# Patient Record
Sex: FEMALE | Race: ASIAN | Employment: FULL TIME | URBAN - METROPOLITAN AREA
[De-identification: names, ages, dates, MRNs, and addresses within clinical notes are randomized per-mention and may not be internally consistent; named-entity substitution may affect disease eponyms.]

---

## 2019-04-04 ENCOUNTER — LAB ENCOUNTER (OUTPATIENT)
Dept: LAB | Age: 24
End: 2019-04-04
Attending: INTERNAL MEDICINE
Payer: COMMERCIAL

## 2019-04-04 ENCOUNTER — OFFICE VISIT (OUTPATIENT)
Dept: INTERNAL MEDICINE CLINIC | Facility: CLINIC | Age: 24
End: 2019-04-04
Payer: COMMERCIAL

## 2019-04-04 VITALS
BODY MASS INDEX: 29.84 KG/M2 | WEIGHT: 152 LBS | DIASTOLIC BLOOD PRESSURE: 80 MMHG | SYSTOLIC BLOOD PRESSURE: 114 MMHG | HEIGHT: 59.7 IN | HEART RATE: 76 BPM | TEMPERATURE: 99 F | OXYGEN SATURATION: 99 %

## 2019-04-04 DIAGNOSIS — Z00.00 PHYSICAL EXAM: ICD-10-CM

## 2019-04-04 DIAGNOSIS — E28.2 PCOS (POLYCYSTIC OVARIAN SYNDROME): ICD-10-CM

## 2019-04-04 DIAGNOSIS — Z00.00 PHYSICAL EXAM: Primary | ICD-10-CM

## 2019-04-04 DIAGNOSIS — N91.2 AMENORRHEA: ICD-10-CM

## 2019-04-04 PROCEDURE — 80061 LIPID PANEL: CPT

## 2019-04-04 PROCEDURE — 36415 COLL VENOUS BLD VENIPUNCTURE: CPT

## 2019-04-04 PROCEDURE — 84443 ASSAY THYROID STIM HORMONE: CPT

## 2019-04-04 PROCEDURE — 85025 COMPLETE CBC W/AUTO DIFF WBC: CPT

## 2019-04-04 PROCEDURE — 99385 PREV VISIT NEW AGE 18-39: CPT | Performed by: INTERNAL MEDICINE

## 2019-04-04 PROCEDURE — 83036 HEMOGLOBIN GLYCOSYLATED A1C: CPT

## 2019-04-04 PROCEDURE — 80053 COMPREHEN METABOLIC PANEL: CPT

## 2019-04-04 PROCEDURE — 84439 ASSAY OF FREE THYROXINE: CPT

## 2019-04-04 NOTE — PROGRESS NOTES
HPI:    Patient ID: Diane Shields is a 26year old female. Patient presents to the office for the first time to establish care. Patient is a 52-year-old female with history of polycystic ovary disease and amenorrhea.   She had been under the care of a gy frequency, urgency and vaginal discharge. Musculoskeletal: Negative for back pain and neck pain. Allergic/Immunologic: Negative for environmental allergies and food allergies.    Neurological: Negative for dizziness, syncope, light-headedness and headac (primary encounter diagnosis)  Pcos (polycystic ovarian syndrome)  Amenorrhea    She has polycystic ovary disease along with amenorrhea. Will have patient see gynecology for further management.   Patient will probably need to start hormonal management to i

## 2019-04-05 ENCOUNTER — APPOINTMENT (OUTPATIENT)
Dept: LAB | Age: 24
End: 2019-04-05
Attending: INTERNAL MEDICINE
Payer: COMMERCIAL

## 2019-04-05 ENCOUNTER — TELEPHONE (OUTPATIENT)
Dept: INTERNAL MEDICINE CLINIC | Facility: CLINIC | Age: 24
End: 2019-04-05

## 2019-04-05 DIAGNOSIS — R79.89 LFT ELEVATION: ICD-10-CM

## 2019-04-05 DIAGNOSIS — R79.89 LFT ELEVATION: Primary | ICD-10-CM

## 2019-04-05 PROCEDURE — 87340 HEPATITIS B SURFACE AG IA: CPT

## 2019-04-05 PROCEDURE — 86704 HEP B CORE ANTIBODY TOTAL: CPT

## 2019-04-05 PROCEDURE — 80500 HEPATITIS A B + C PROFILE: CPT

## 2019-04-05 PROCEDURE — 86706 HEP B SURFACE ANTIBODY: CPT

## 2019-04-05 PROCEDURE — 36415 COLL VENOUS BLD VENIPUNCTURE: CPT

## 2019-04-05 PROCEDURE — 86708 HEPATITIS A ANTIBODY: CPT

## 2019-04-05 PROCEDURE — 86803 HEPATITIS C AB TEST: CPT

## 2019-04-05 NOTE — TELEPHONE ENCOUNTER
I spoke with patient and relayed Dr. Justina Webber message. Patient verbalized understanding. I spoke with the lab and they are unable to add on the hepatitis panel because it requires a gold top. I spoke with patient and asked her if she would be able to get her blood drawn today. Patient says she thinks she can go today.

## 2019-04-05 NOTE — TELEPHONE ENCOUNTER
----- Message from Boston Melvin MD sent at 4/5/2019  7:48 AM CDT -----  General general chemistries show LFT elevation. I have added hepatitis profile to patient's previously drawn blood. If this is normal, may need to pursue liver ultrasound. Otherwise general chemistries, CBC, thyroid function test, hemoglobin A1c are all unremarkable. Cholesterol is 179 with LDL of 106--this is at goal LDL of less than 130. No other interventions needed at this time. Await gynecological evaluation for further management of polycystic ovary disease and amenorrhea.

## 2019-04-08 DIAGNOSIS — R79.89 LFT ELEVATION: Primary | ICD-10-CM

## 2019-04-23 ENCOUNTER — OFFICE VISIT (OUTPATIENT)
Dept: OBGYN CLINIC | Facility: CLINIC | Age: 24
End: 2019-04-23
Payer: COMMERCIAL

## 2019-04-23 VITALS
WEIGHT: 157.19 LBS | SYSTOLIC BLOOD PRESSURE: 126 MMHG | DIASTOLIC BLOOD PRESSURE: 83 MMHG | HEIGHT: 59.5 IN | BODY MASS INDEX: 31.27 KG/M2 | HEART RATE: 77 BPM

## 2019-04-23 DIAGNOSIS — E28.2 PCOS (POLYCYSTIC OVARIAN SYNDROME): Primary | ICD-10-CM

## 2019-04-23 PROCEDURE — 99203 OFFICE O/P NEW LOW 30 MIN: CPT | Performed by: OBSTETRICS & GYNECOLOGY

## 2019-04-23 NOTE — PROGRESS NOTES
Anurag Epstein is a 26year old female  Patient's last menstrual period was 2018 (approximate). Patient presents with:  Consult: PCOS/New pt to [facility]     Patient is a new patient at 19yo who presents for amenorrhea for one year.  Pt states she has Lifestyle      Physical activity:        Days per week: Not on file        Minutes per session: Not on file      Stress: Not on file    Relationships      Social connections:        Talks on phone: Not on file        Gets together: Not on file        Atten ovarian syndrome)  -     ESTRADIOL; Future  -     FSH; Future  -     LH (LUTEINIZING HORMONE); Future  -     PROLACTIN; Future  -     ASSAY, THYROID STIM HORMONE; Future  -     TESTOSTERONE,FREE AND TOTAL;  Future  -     DEHYDROEPIANDROSTERONE SULFATE; Futu

## 2019-04-25 ENCOUNTER — HOSPITAL ENCOUNTER (OUTPATIENT)
Dept: ULTRASOUND IMAGING | Age: 24
Discharge: HOME | End: 2019-04-25
Attending: INTERNAL MEDICINE
Payer: COMMERCIAL

## 2019-04-25 DIAGNOSIS — R79.89 ELEVATED LFTS: Primary | ICD-10-CM

## 2019-04-25 DIAGNOSIS — R79.89 LFT ELEVATION: ICD-10-CM

## 2019-04-25 PROCEDURE — 76705 ECHO EXAM OF ABDOMEN: CPT | Performed by: INTERNAL MEDICINE

## 2019-05-02 ENCOUNTER — APPOINTMENT (OUTPATIENT)
Dept: LAB | Age: 24
End: 2019-05-02
Attending: OBSTETRICS & GYNECOLOGY
Payer: COMMERCIAL

## 2019-05-02 ENCOUNTER — HOSPITAL ENCOUNTER (OUTPATIENT)
Dept: ULTRASOUND IMAGING | Age: 24
Discharge: HOME | End: 2019-05-02
Attending: OBSTETRICS & GYNECOLOGY
Payer: COMMERCIAL

## 2019-05-02 DIAGNOSIS — E28.2 PCOS (POLYCYSTIC OVARIAN SYNDROME): ICD-10-CM

## 2019-05-02 PROCEDURE — 84146 ASSAY OF PROLACTIN: CPT

## 2019-05-02 PROCEDURE — 82627 DEHYDROEPIANDROSTERONE: CPT

## 2019-05-02 PROCEDURE — 83002 ASSAY OF GONADOTROPIN (LH): CPT

## 2019-05-02 PROCEDURE — 84443 ASSAY THYROID STIM HORMONE: CPT

## 2019-05-02 PROCEDURE — 84403 ASSAY OF TOTAL TESTOSTERONE: CPT

## 2019-05-02 PROCEDURE — 83001 ASSAY OF GONADOTROPIN (FSH): CPT

## 2019-05-02 PROCEDURE — 82670 ASSAY OF TOTAL ESTRADIOL: CPT

## 2019-05-02 PROCEDURE — 36415 COLL VENOUS BLD VENIPUNCTURE: CPT

## 2019-05-02 PROCEDURE — 76856 US EXAM PELVIC COMPLETE: CPT | Performed by: OBSTETRICS & GYNECOLOGY

## 2019-05-02 PROCEDURE — 84402 ASSAY OF FREE TESTOSTERONE: CPT

## 2019-05-03 ENCOUNTER — TELEPHONE (OUTPATIENT)
Dept: OBGYN CLINIC | Facility: CLINIC | Age: 24
End: 2019-05-03

## 2019-05-03 NOTE — TELEPHONE ENCOUNTER
Called lab and talked to Marlon and she she stated they can run, Estradiol, LH, FSH, Prolactin and TSH off of the blood they robbin for other labs yesterday. Sent to [facility] as a FYI.      Informed pt that the lab is adding on the above labs with blood they have dr

## 2019-05-03 NOTE — TELEPHONE ENCOUNTER
----- Message from Elisha Hennessy MD sent at 5/3/2019  2:10 PM CDT -----  Please let patient know that her US was normal. I recommend she make an apt to discuss results of labs, ultrasound and discuss treatment options.

## 2019-05-03 NOTE — TELEPHONE ENCOUNTER
Informed pt that MD stated that her her US was normal and she recommend she make an apt to discuss results of labs, ultrasound and discuss treatment options.

## 2019-05-22 ENCOUNTER — OFFICE VISIT (OUTPATIENT)
Dept: OBGYN CLINIC | Facility: CLINIC | Age: 24
End: 2019-05-22
Payer: COMMERCIAL

## 2019-05-22 VITALS
DIASTOLIC BLOOD PRESSURE: 83 MMHG | SYSTOLIC BLOOD PRESSURE: 130 MMHG | HEART RATE: 64 BPM | BODY MASS INDEX: 32 KG/M2 | WEIGHT: 160 LBS

## 2019-05-22 DIAGNOSIS — E28.2 PCOS (POLYCYSTIC OVARIAN SYNDROME): Primary | ICD-10-CM

## 2019-05-22 PROCEDURE — 99213 OFFICE O/P EST LOW 20 MIN: CPT | Performed by: OBSTETRICS & GYNECOLOGY

## 2019-05-22 NOTE — PROGRESS NOTES
Archana Pettit is a 26year old female  No LMP recorded. (Menstrual status: Polycystic Ovarian Syndrome). Patient presents with: Follow - Up: Pt in to discuss her lab results and u/s.       Patient is a 19yo female who presents with her mother today t Gets together: Not on file        Attends Mandaen service: Not on file        Active member of club or organization: Not on file        Attends meetings of clubs or organizations: Not on file        Relationship status: Not on file      Intimate partn prior to doing so. 4. At this time will start Provera for 5 days. Pt to call if no menses. 5. Pt to call q 3 months for Provera if unable to take OCPs    I spent 15 minutes on the patient and more than 50% was face to face time.

## 2019-06-22 ENCOUNTER — PATIENT MESSAGE (OUTPATIENT)
Dept: INTERNAL MEDICINE CLINIC | Facility: CLINIC | Age: 24
End: 2019-06-22

## 2019-06-22 DIAGNOSIS — H53.9 VISION CHANGES: Primary | ICD-10-CM

## 2019-06-24 NOTE — TELEPHONE ENCOUNTER
From: Bere Barroso  To:  Jaja Lama MD  Sent: 6/22/2019 10:44 PM CDT  Subject: Referral Request    Good evening,    I broke my glasses and when I went to order another pair, I realized that its been over a year since my last eye exam. With Giovnaa,

## 2019-08-09 ENCOUNTER — OFFICE VISIT (OUTPATIENT)
Dept: OPTOMETRY | Facility: CLINIC | Age: 24
End: 2019-08-09
Payer: MEDICAID

## 2019-08-09 DIAGNOSIS — H52.13 MYOPIA OF BOTH EYES WITH ASTIGMATISM: Primary | ICD-10-CM

## 2019-08-09 DIAGNOSIS — H52.203 MYOPIA OF BOTH EYES WITH ASTIGMATISM: Primary | ICD-10-CM

## 2019-08-09 PROCEDURE — 92015 DETERMINE REFRACTIVE STATE: CPT | Performed by: OPTOMETRIST

## 2019-08-09 PROCEDURE — 92004 COMPRE OPH EXAM NEW PT 1/>: CPT | Performed by: OPTOMETRIST

## 2019-08-09 NOTE — PROGRESS NOTES
Clarence Powell is a 26year old female. HPI:     HPI     Patient broke her glasses and she needs a new RX before buying another pair. She has no complaints with her vision. States has [place] lot of astigmatism\".     Last edited by Sheryle Maltos, KRISTIAN on 8/9/2019 External Exam       Right Left    External Normal Normal          Slit Lamp Exam       Right Left    Lids/Lashes Normal Normal    Conjunctiva/Sclera Normal Normal    Cornea Clear Clear    Anterior Chamber Deep and quiet Deep and quiet    Iris Normal Normal

## 2019-10-02 RX ORDER — MEDROXYPROGESTERONE ACETATE 10 MG/1
10 TABLET ORAL DAILY
Qty: 5 TABLET | Refills: 0 | Status: SHIPPED | OUTPATIENT
Start: 2019-10-02 | End: 2020-07-13

## 2020-04-03 ENCOUNTER — TELEPHONE (OUTPATIENT)
Dept: INTERNAL MEDICINE CLINIC | Facility: CLINIC | Age: 25
End: 2020-04-03

## 2020-04-03 RX ORDER — MEDROXYPROGESTERONE ACETATE 10 MG/1
10 TABLET ORAL DAILY
Qty: 5 TABLET | Refills: 1 | Status: SHIPPED | OUTPATIENT
Start: 2020-04-03 | End: 2020-07-13

## 2020-04-03 NOTE — TELEPHONE ENCOUNTER
Sorry there was a misunderstand as I was told she wanted OCPs. [id number] for Provera. I already sent it to her pharmacy. She needs to take home UPT first and then can start Provera.

## 2020-04-03 NOTE — TELEPHONE ENCOUNTER
Pts cousin called - pt  is establishing care -- she states pt  has been out of her meds and has not had a period in 1 mns -- she had some insurance problems in the recent past but thinks its is now all taken care of --needs a refill on her ocps   Reviewed

## 2020-04-03 NOTE — TELEPHONE ENCOUNTER
Informed pt that there was a misunderstanding as she was informed that she wanted ocps. Berta Cable for Provera. Informed pt that it was sent to her pharmacy. Informed pt tht she needs to take a home upt and if neg then she can start Provera.

## 2020-04-03 NOTE — TELEPHONE ENCOUNTER
Please call patient and see if she needs a refill on OCPs. I am ok with 3 months worth of micronor for patient. She needs to take a home UPT first before starting.

## 2020-04-03 NOTE — TELEPHONE ENCOUNTER
Informed pt of [facility] recs below. Pt states she is asking for refill of Provera and not OCPs. Pt states she has never been on OCPs and would like to continue Provera. Informed pt Provera is not BC. Pt states she knows but wants [facility] to refill Provera.  Asked pt

## 2020-07-13 ENCOUNTER — OFFICE VISIT (OUTPATIENT)
Dept: INTERNAL MEDICINE CLINIC | Facility: CLINIC | Age: 25
End: 2020-07-13
Payer: COMMERCIAL

## 2020-07-13 VITALS
WEIGHT: 166.81 LBS | BODY MASS INDEX: 33.63 KG/M2 | HEIGHT: 59 IN | DIASTOLIC BLOOD PRESSURE: 102 MMHG | SYSTOLIC BLOOD PRESSURE: 132 MMHG | TEMPERATURE: 99 F | HEART RATE: 97 BPM

## 2020-07-13 DIAGNOSIS — R59.0 ENLARGED LYMPH NODE IN NECK: Primary | ICD-10-CM

## 2020-07-13 PROCEDURE — 99203 OFFICE O/P NEW LOW 30 MIN: CPT | Performed by: PHYSICIAN ASSISTANT

## 2020-07-13 PROCEDURE — 99212 OFFICE O/P EST SF 10 MIN: CPT | Performed by: PHYSICIAN ASSISTANT

## 2020-07-13 RX ORDER — METHYLPREDNISOLONE 4 MG/1
TABLET ORAL
Qty: 1 KIT | Refills: 0 | Status: SHIPPED | OUTPATIENT
Start: 2020-07-13 | End: 2020-09-11

## 2020-07-13 NOTE — PROGRESS NOTES
HPI:    Patient ID: Yuri Lang is a 22year old female. HPI   Patient presents with left sided neck pain, pain with swallowing and point tenderness in the submental area. Denies fever, URI sx, ear pain.  States happened over 2 days time, worse today Cardiovascular: Normal rate, regular rhythm and normal heart sounds. Pulmonary/Chest: Effort normal and breath sounds normal. No respiratory distress. She has no wheezes. Abdominal: Soft. There is no tenderness.    Lymphadenopathy:     She has no cervi

## 2020-09-11 ENCOUNTER — OFFICE VISIT (OUTPATIENT)
Dept: INTERNAL MEDICINE CLINIC | Facility: CLINIC | Age: 25
End: 2020-09-11
Payer: COMMERCIAL

## 2020-09-11 VITALS
HEIGHT: 59 IN | DIASTOLIC BLOOD PRESSURE: 88 MMHG | TEMPERATURE: 97 F | WEIGHT: 169 LBS | RESPIRATION RATE: 17 BRPM | HEART RATE: 73 BPM | BODY MASS INDEX: 34.07 KG/M2 | SYSTOLIC BLOOD PRESSURE: 138 MMHG

## 2020-09-11 DIAGNOSIS — N91.2 AMENORRHEA: ICD-10-CM

## 2020-09-11 DIAGNOSIS — E28.2 PCOS (POLYCYSTIC OVARIAN SYNDROME): ICD-10-CM

## 2020-09-11 DIAGNOSIS — I10 HYPERTENSION, UNSPECIFIED TYPE: ICD-10-CM

## 2020-09-11 DIAGNOSIS — R74.8 ELEVATED LIVER ENZYMES: ICD-10-CM

## 2020-09-11 DIAGNOSIS — H61.23 BILATERAL IMPACTED CERUMEN: ICD-10-CM

## 2020-09-11 DIAGNOSIS — E66.09 CLASS 1 OBESITY DUE TO EXCESS CALORIES WITH SERIOUS COMORBIDITY AND BODY MASS INDEX (BMI) OF 34.0 TO 34.9 IN ADULT: ICD-10-CM

## 2020-09-11 DIAGNOSIS — Z00.00 PHYSICAL EXAM: Primary | ICD-10-CM

## 2020-09-11 PROCEDURE — 3008F BODY MASS INDEX DOCD: CPT | Performed by: INTERNAL MEDICINE

## 2020-09-11 PROCEDURE — 3075F SYST BP GE 130 - 139MM HG: CPT | Performed by: INTERNAL MEDICINE

## 2020-09-11 PROCEDURE — 99395 PREV VISIT EST AGE 18-39: CPT | Performed by: INTERNAL MEDICINE

## 2020-09-11 PROCEDURE — 3079F DIAST BP 80-89 MM HG: CPT | Performed by: INTERNAL MEDICINE

## 2020-09-11 RX ORDER — MEDROXYPROGESTERONE ACETATE 5 MG/1
10 TABLET ORAL DAILY
COMMUNITY
Start: 2020-04-04 | End: 2020-11-04

## 2020-09-11 RX ORDER — MEDROXYPROGESTERONE ACETATE 5 MG/1
10 TABLET ORAL DAILY
Qty: 10 TABLET | Refills: 0 | Status: CANCELLED | OUTPATIENT
Start: 2020-09-11

## 2020-09-11 NOTE — PROGRESS NOTES
Minh Min is a 22year old female.   Patient presents with:  Referral: GYN      HPI:   New patient--used to see dr Margo Sterling   C/C physical  C/o LMP 4/2020 --wanted Medroxyprogesterone to initiate bleeding  Was on metformin    Noted bp is elevated       PMH clear, no frontal or maxillary sinus tenderness, pupils equal reactive light bilaterally, extra muscles intact  NECK: supple,no adenopathy, nontender  LUNGS: clear to auscultation, no wheeze  CARDIO: RRR without murmur  GI: good BS's,no masses or tendernes

## 2020-09-11 NOTE — PATIENT INSTRUCTIONS
Prevention Guidelines, Women Ages 25 to 44  Screening tests and vaccines are an important part of managing your health. A screening test is done to find possible disorders or diseases in people who don't have any symptoms.  The goal is to find a disease e Anyone at increased risk  At routine exams   HIV All women At routine exams3     Obesity All women in this age group  At routine exams   Syphilis Women at increased risk for infection should talk with their healthcare provider  At routine exams   Tuberculo (protects against 13 types of pneumococcal bacteria)   PPSV23: 1 to 2 doses through age 59, or 1 dose at 72 or older (protects against 23 types of pneumococcal bacteria)    Tetanus/diphtheria/pertussis (Td/Tdap) booster All women in this age group  Td ever All rights reserved. This information is not intended as a substitute for professional medical care. Always follow your healthcare professional's instructions. Eating for your heart doesn’t have to be hard or boring.  You just need to know how to make meats, poultry, fish  Servings: 6 or fewer a day  A serving is:  · 1 ounce cooked meats, poultry, or fish  · 1 egg  Best choices: Lean poultry and fish. Trim away visible fat. Broil, grill, roast, or boil instead of frying.  Remove skin from poultry before labels.   · Low-fat milk or yogurt  · Unsalted eggs  · Shredded wheat  · Corn tortillas  · Unsalted steamed rice  · Regular (not instant) hot cereal, made without salt  Stay away from:  · Sausage, oconnell, and ham  · Flour tortillas  · Packaged muffins, panca

## 2020-09-17 LAB
ABSOLUTE BASOPHILS: 91 CELLS/UL (ref 0–200)
ABSOLUTE EOSINOPHILS: 198 CELLS/UL (ref 15–500)
ABSOLUTE LYMPHOCYTES: 3200 CELLS/UL (ref 850–3900)
ABSOLUTE MONOCYTES: 608 CELLS/UL (ref 200–950)
ABSOLUTE NEUTROPHILS: 3504 CELLS/UL (ref 1500–7800)
ALBUMIN/GLOBULIN RATIO: 1.3 (CALC) (ref 1–2.5)
ALBUMIN: 4.2 G/DL (ref 3.6–5.1)
ALKALINE PHOSPHATASE: 52 U/L (ref 31–125)
ALT: 184 U/L (ref 6–29)
AST: 84 U/L (ref 10–30)
BASOPHILS: 1.2 %
BILIRUBIN, TOTAL: 1.4 MG/DL (ref 0.2–1.2)
BUN: 10 MG/DL (ref 7–25)
CALCIUM: 9.5 MG/DL (ref 8.6–10.2)
CARBON DIOXIDE: 29 MMOL/L (ref 20–32)
CHLORIDE: 104 MMOL/L (ref 98–110)
CHOL/HDLC RATIO: 4.2 (CALC)
CHOLESTEROL, TOTAL: 152 MG/DL
CREATININE: 0.82 MG/DL (ref 0.5–1.1)
EGFR IF AFRICN AM: 117 ML/MIN/1.73M2
EGFR IF NONAFRICN AM: 101 ML/MIN/1.73M2
EOSINOPHILS: 2.6 %
GLOBULIN: 3.2 G/DL (CALC) (ref 1.9–3.7)
GLUCOSE: 122 MG/DL (ref 65–99)
HDL CHOLESTEROL: 36 MG/DL
HEMATOCRIT: 42.5 % (ref 35–45)
HEMOGLOBIN: 14.6 G/DL (ref 11.7–15.5)
LDL-CHOLESTEROL: 92 MG/DL (CALC)
LYMPHOCYTES: 42.1 %
MCH: 30.4 PG (ref 27–33)
MCHC: 34.4 G/DL (ref 32–36)
MCV: 88.5 FL (ref 80–100)
MONOCYTES: 8 %
MPV: 13.4 FL (ref 7.5–12.5)
NEUTROPHILS: 46.1 %
NON-HDL CHOLESTEROL: 116 MG/DL (CALC)
PLATELET COUNT: 178 THOUSAND/UL (ref 140–400)
POTASSIUM: 4.1 MMOL/L (ref 3.5–5.3)
PROTEIN, TOTAL: 7.4 G/DL (ref 6.1–8.1)
RDW: 13 % (ref 11–15)
RED BLOOD CELL COUNT: 4.8 MILLION/UL (ref 3.8–5.1)
SODIUM: 139 MMOL/L (ref 135–146)
TRIGLYCERIDES: 144 MG/DL
TSH: 2.73 MIU/L
WHITE BLOOD CELL COUNT: 7.6 THOUSAND/UL (ref 3.8–10.8)

## 2020-09-18 DIAGNOSIS — R74.8 ELEVATED LIVER ENZYMES: Primary | ICD-10-CM

## 2020-09-18 DIAGNOSIS — R73.9 ELEVATED BLOOD SUGAR: ICD-10-CM

## 2020-11-04 ENCOUNTER — TELEPHONE (OUTPATIENT)
Dept: OBGYN CLINIC | Facility: CLINIC | Age: 25
End: 2020-11-04

## 2020-11-04 ENCOUNTER — OFFICE VISIT (OUTPATIENT)
Dept: OBGYN CLINIC | Facility: CLINIC | Age: 25
End: 2020-11-04
Payer: COMMERCIAL

## 2020-11-04 VITALS
SYSTOLIC BLOOD PRESSURE: 147 MMHG | HEART RATE: 89 BPM | BODY MASS INDEX: 35 KG/M2 | WEIGHT: 173 LBS | DIASTOLIC BLOOD PRESSURE: 101 MMHG

## 2020-11-04 DIAGNOSIS — E28.2 PCOS (POLYCYSTIC OVARIAN SYNDROME): ICD-10-CM

## 2020-11-04 DIAGNOSIS — Z01.419 ENCOUNTER FOR GYNECOLOGICAL EXAMINATION WITHOUT ABNORMAL FINDING: Primary | ICD-10-CM

## 2020-11-04 PROCEDURE — 3080F DIAST BP >= 90 MM HG: CPT | Performed by: OBSTETRICS & GYNECOLOGY

## 2020-11-04 PROCEDURE — 3077F SYST BP >= 140 MM HG: CPT | Performed by: OBSTETRICS & GYNECOLOGY

## 2020-11-04 PROCEDURE — 99395 PREV VISIT EST AGE 18-39: CPT | Performed by: OBSTETRICS & GYNECOLOGY

## 2020-11-04 RX ORDER — MEDROXYPROGESTERONE ACETATE 5 MG/1
10 TABLET ORAL DAILY
Qty: 5 TABLET | Refills: 3 | Status: SHIPPED | OUTPATIENT
Start: 2020-11-04 | End: 2020-11-09

## 2020-11-04 NOTE — TELEPHONE ENCOUNTER
Rx was sent for Provera 5mg, #5 with 3 refills. Directions state take 2 table daily for 5 days. Pt would need #10 for these directions. Message to [facility] to clarify amount and directions.

## 2020-11-04 NOTE — TELEPHONE ENCOUNTER
[place]/pharm calling regarding rx:  MedroxyPROGESTERone, needs clarification on directions / amount of tablets, please call at:[phone],thanks.     Current Outpatient Medications:   •  medroxyPROGESTERone Acetate 5 MG Oral Tab, Take 2 tablets (10 mg total

## 2020-11-04 NOTE — PROGRESS NOTES
Well Woman Exam    HPI:  The patient is a 20yo female who presents today for annual exam. She has a known history of PCOs. She takes provera irregularly and last menses with provera was in April.  Pt with liver problem and to see specialist before able to t Intimate partner violence        Fear of current or ex partner: Not on file        Emotionally abused: Not on file        Physically abused: Not on file        Forced sexual activity: Not on file    Other Topics      Concerns:         Service: No without palpable masses, tenderness, asymmetry, nipple discharge, nipple retraction or skin changes  Abdomen:  soft, nontender, nondistended, no masses  Skin/Hair: no unusual rashes or bruises  Extremities: no edema, no cyanosis  Psychiatric: Appropriate m

## 2020-11-19 ENCOUNTER — OFFICE VISIT (OUTPATIENT)
Dept: GASTROENTEROLOGY | Facility: CLINIC | Age: 25
End: 2020-11-19
Payer: COMMERCIAL

## 2020-11-19 VITALS
DIASTOLIC BLOOD PRESSURE: 100 MMHG | BODY MASS INDEX: 34.68 KG/M2 | HEIGHT: 59 IN | TEMPERATURE: 98 F | WEIGHT: 172 LBS | SYSTOLIC BLOOD PRESSURE: 150 MMHG

## 2020-11-19 DIAGNOSIS — R74.8 ELEVATED LIVER ENZYMES: Primary | ICD-10-CM

## 2020-11-19 PROCEDURE — 3077F SYST BP >= 140 MM HG: CPT | Performed by: INTERNAL MEDICINE

## 2020-11-19 PROCEDURE — 3008F BODY MASS INDEX DOCD: CPT | Performed by: INTERNAL MEDICINE

## 2020-11-19 PROCEDURE — 99203 OFFICE O/P NEW LOW 30 MIN: CPT | Performed by: INTERNAL MEDICINE

## 2020-11-19 PROCEDURE — 3080F DIAST BP >= 90 MM HG: CPT | Performed by: INTERNAL MEDICINE

## 2020-11-19 RX ORDER — MEDROXYPROGESTERONE ACETATE 5 MG/1
5 TABLET ORAL 2 TIMES DAILY
COMMUNITY
End: 2021-10-19 | Stop reason: ALTCHOICE

## 2020-11-19 NOTE — PATIENT INSTRUCTIONS
1. Please contact central scheduling regarding your ordered blood test(s) at [phone]     This is to look for other causes of liver test elevation    2.  Weight loss is key    The goal of weight loss is 7-10% through diet and exercise    The goal for ex

## 2020-11-19 NOTE — H&P
[facility], [facility] - Gastroenterology                                                                                                  Clinic History and Physical without exudates or lesions  Neck: no lymphadenopathy; thyroid is not enlarged and without nodules  CV: RRR  Resp: non-labored breathing  Abd: soft, non-tender, non-distended  Ext: no lower extremity swelling  Neuro: Alert, Oriented X 3  Skin: no rashes, b

## 2020-12-09 ENCOUNTER — LAB ENCOUNTER (OUTPATIENT)
Dept: LAB | Facility: HOSPITAL | Age: 25
End: 2020-12-09
Attending: INTERNAL MEDICINE
Payer: COMMERCIAL

## 2020-12-09 DIAGNOSIS — R74.8 ELEVATED LIVER ENZYMES: ICD-10-CM

## 2020-12-09 PROCEDURE — 86256 FLUORESCENT ANTIBODY TITER: CPT

## 2020-12-09 PROCEDURE — 82390 ASSAY OF CERULOPLASMIN: CPT

## 2020-12-09 PROCEDURE — 83516 IMMUNOASSAY NONANTIBODY: CPT

## 2020-12-09 PROCEDURE — 36415 COLL VENOUS BLD VENIPUNCTURE: CPT

## 2020-12-09 PROCEDURE — 82784 ASSAY IGA/IGD/IGG/IGM EACH: CPT

## 2020-12-16 ENCOUNTER — TELEPHONE (OUTPATIENT)
Dept: GASTROENTEROLOGY | Facility: CLINIC | Age: 25
End: 2020-12-16

## 2020-12-16 NOTE — TELEPHONE ENCOUNTER
GI staff:    Please recall patient for blood tests (I ordered in epic) as she is quest for 3 months from now     Then close encounter    Thanks    Macy Mondragon MD  [facility], [facility] - Gastroenterology  12/16/2020  2:13 PM

## 2021-03-16 ENCOUNTER — TELEPHONE (OUTPATIENT)
Dept: GASTROENTEROLOGY | Facility: CLINIC | Age: 26
End: 2021-03-16

## 2021-03-16 NOTE — TELEPHONE ENCOUNTER
Patient outreach message received. Patient is due for repeat labs.        \"GI staff:     Please recall patient for blood tests (I ordered in epic) as she is quest for 3 months from now      Then close encounter     Thanks     Ida White MD  [facility]

## 2021-04-07 ENCOUNTER — TELEPHONE (OUTPATIENT)
Dept: GASTROENTEROLOGY | Facility: CLINIC | Age: 26
End: 2021-04-07

## 2021-04-07 ENCOUNTER — TELEPHONE (OUTPATIENT)
Dept: OBGYN CLINIC | Facility: CLINIC | Age: 26
End: 2021-04-07

## 2021-04-07 NOTE — TELEPHONE ENCOUNTER
Lab orders faxed to [facility] in [place] #[phone]    Fax sent successfully. Patient notified via [employer] as requested and message was read. Last read by Zhane Guerra at 5:01 PM on 4/7/2021.

## 2021-04-07 NOTE — TELEPHONE ENCOUNTER
Pt requesting to have lab orders faxed to [facility] in [place] on [address] [phone].  Pt requesting to have a Our[employer] message sent once labs have been faxed

## 2021-04-07 NOTE — TELEPHONE ENCOUNTER
Pt requesting refill of Provera. Pt states she last took Provera in 11/2020. Pt unsure of exact lmp but states it was after she took Provera in 11/2020. Informed pt KENDRA sent Rx to pharmacy on 11/4/2020 for Provera with 3 additional refills.  Informed pt if

## 2021-04-24 ENCOUNTER — HOSPITAL ENCOUNTER (OUTPATIENT)
Dept: NUTRITION | Facility: HOSPITAL | Age: 26
Discharge: HOME | End: 2021-04-24
Attending: INTERNAL MEDICINE
Payer: COMMERCIAL

## 2021-04-24 DIAGNOSIS — E66.09 CLASS 1 OBESITY DUE TO EXCESS CALORIES WITH SERIOUS COMORBIDITY AND BODY MASS INDEX (BMI) OF 34.0 TO 34.9 IN ADULT: ICD-10-CM

## 2021-04-24 DIAGNOSIS — R74.8 ELEVATED LIVER ENZYMES: ICD-10-CM

## 2021-04-24 DIAGNOSIS — I10 HYPERTENSION, UNSPECIFIED TYPE: ICD-10-CM

## 2021-04-24 PROCEDURE — 97802 MEDICAL NUTRITION INDIV IN: CPT | Performed by: DIETITIAN, REGISTERED

## 2021-04-24 NOTE — PROGRESS NOTES
Nutrition Assessment    Anurag Epstein is a 26year old female. Referred by:  Attending  Referring Physician Name: Sunni Hoffman    Assessment     Medical Nutrition Therapy Comment: HTN, Elevated liver enzymes, Obesity  Visit Information: Initial Visit 4/24 labs    Intervention     Nutrition Education: Recommended Modification  Nutrition/Diet Handouts Given: Weight Loss Diet Handouts:  Weight Management Packet Includes: Calorie/Carb Counting, Food Label Reading and Food Journal, Fiber Content of food, Meal pl

## 2021-07-01 ENCOUNTER — NURSE TRIAGE (OUTPATIENT)
Dept: INTERNAL MEDICINE CLINIC | Facility: CLINIC | Age: 26
End: 2021-07-01

## 2021-07-01 NOTE — TELEPHONE ENCOUNTER
Action Requested: Summary for Provider     []  Critical Lab, Recommendations Needed  [] Need Additional Advice  []   FYI    []   Need Orders  [] Need Medications Sent to Pharmacy  []  Other     SUMMARY: Per protocol disposition advised office visit today.

## 2021-07-01 NOTE — TELEPHONE ENCOUNTER
----- Message from Rolando Hanson sent at 6/30/2021  6:42 PM CDT -----  Regarding: Non-Urgent Medical Question  Contact: [phone]  Hello,    I've experienced recent hearing loss, possibly from using an ear cleaning kit.  I can still hear but it has become

## 2021-07-02 ENCOUNTER — OFFICE VISIT (OUTPATIENT)
Dept: FAMILY MEDICINE CLINIC | Facility: CLINIC | Age: 26
End: 2021-07-02
Payer: COMMERCIAL

## 2021-07-02 VITALS
BODY MASS INDEX: 35.28 KG/M2 | WEIGHT: 175 LBS | SYSTOLIC BLOOD PRESSURE: 151 MMHG | HEART RATE: 99 BPM | DIASTOLIC BLOOD PRESSURE: 93 MMHG | HEIGHT: 59 IN

## 2021-07-02 DIAGNOSIS — H61.23 BILATERAL IMPACTED CERUMEN: ICD-10-CM

## 2021-07-02 DIAGNOSIS — H91.93 CHANGE IN HEARING, BILATERAL: Primary | ICD-10-CM

## 2021-07-02 DIAGNOSIS — R03.0 ELEVATED BLOOD PRESSURE READING: ICD-10-CM

## 2021-07-02 PROCEDURE — 99213 OFFICE O/P EST LOW 20 MIN: CPT | Performed by: STUDENT IN AN ORGANIZED HEALTH CARE EDUCATION/TRAINING PROGRAM

## 2021-07-02 PROCEDURE — 3008F BODY MASS INDEX DOCD: CPT | Performed by: STUDENT IN AN ORGANIZED HEALTH CARE EDUCATION/TRAINING PROGRAM

## 2021-07-02 PROCEDURE — 3077F SYST BP >= 140 MM HG: CPT | Performed by: STUDENT IN AN ORGANIZED HEALTH CARE EDUCATION/TRAINING PROGRAM

## 2021-07-02 PROCEDURE — 69210 REMOVE IMPACTED EAR WAX UNI: CPT | Performed by: STUDENT IN AN ORGANIZED HEALTH CARE EDUCATION/TRAINING PROGRAM

## 2021-07-02 PROCEDURE — 3080F DIAST BP >= 90 MM HG: CPT | Performed by: STUDENT IN AN ORGANIZED HEALTH CARE EDUCATION/TRAINING PROGRAM

## 2021-07-02 NOTE — PROGRESS NOTES
HPI:    Patient ID: Rolando Hanson is a 26year old female. HPI  Pt presenting with hearing changes. Over the last month she reports decreased hearing b/l, L>R with \"whispering\" in Left ear. She denies any pain, dizziness, headaches.  No known h/o season focal deficit present. Mental Status: She is alert and oriented to person, place, and time. Mental status is at baseline.    Psychiatric:         Attention and Perception: Attention normal.         Mood and Affect: Mood normal.         Behavior: Blondell Balling

## 2021-07-03 PROBLEM — R03.0 ELEVATED BLOOD PRESSURE READING: Status: ACTIVE | Noted: 2021-07-03

## 2021-09-08 ENCOUNTER — TELEPHONE (OUTPATIENT)
Dept: OBGYN CLINIC | Facility: CLINIC | Age: 26
End: 2021-09-08

## 2021-09-08 DIAGNOSIS — N91.2 ABSENT MENSES: Primary | ICD-10-CM

## 2021-09-08 NOTE — TELEPHONE ENCOUNTER
Pt requesting Provera rx refill (to induce period). States last period was some time in April.  Informed pt, when she last saw Noah Portillo on 11/4/20 for annual, Provera was ordered with refills to cover pt for one year and pt should be taking it every 3 months if

## 2021-09-08 NOTE — TELEPHONE ENCOUNTER
Pt informed of [facility] recs and verbalized understanding. Order placed for hcg qual. Pt advised to call the next day for results so we can send provera.

## 2021-09-08 NOTE — TELEPHONE ENCOUNTER
Pt formerly Dr Mony Grady pt needs a refill on .  Pt last visit with [facility] 11/4/20

## 2021-10-11 ENCOUNTER — PATIENT MESSAGE (OUTPATIENT)
Dept: OBGYN CLINIC | Facility: CLINIC | Age: 26
End: 2021-10-11

## 2021-10-11 DIAGNOSIS — N91.2 ABSENT MENSES: Primary | ICD-10-CM

## 2021-10-13 ENCOUNTER — LAB ENCOUNTER (OUTPATIENT)
Dept: LAB | Facility: HOSPITAL | Age: 26
End: 2021-10-13
Attending: OBSTETRICS & GYNECOLOGY
Payer: COMMERCIAL

## 2021-10-13 DIAGNOSIS — N91.2 ABSENT MENSES: ICD-10-CM

## 2021-10-13 PROCEDURE — 84703 CHORIONIC GONADOTROPIN ASSAY: CPT

## 2021-10-13 PROCEDURE — 36415 COLL VENOUS BLD VENIPUNCTURE: CPT

## 2021-10-18 NOTE — TELEPHONE ENCOUNTER
(Pt did not receive Provera in September). Pt states last period was in April. Advised pt she needs to have a period every 3 months and if she should call clinic if she does not.    Instructed pt she needs to repeat HCG and notify us via LaunchKeyhart or she can

## 2021-10-18 NOTE — TELEPHONE ENCOUNTER
Message to [address] to please advise if we can send Provera off of neg pregnancy test that was done on 10/13 as long as pt has not been sexually active since? Or does she need to repeat again?

## 2021-10-18 NOTE — TELEPHONE ENCOUNTER
See message in Sept -- did she not get provera in Sept or never did hcg back in Sept? She will need to repeat hcg & if negative, give provera 10 mg daily x 5 days (only  needs period once every 3 months)

## 2021-10-19 ENCOUNTER — LAB ENCOUNTER (OUTPATIENT)
Dept: LAB | Facility: HOSPITAL | Age: 26
End: 2021-10-19
Attending: OBSTETRICS & GYNECOLOGY
Payer: COMMERCIAL

## 2021-10-19 ENCOUNTER — TELEPHONE (OUTPATIENT)
Dept: OBGYN CLINIC | Facility: CLINIC | Age: 26
End: 2021-10-19

## 2021-10-19 DIAGNOSIS — N91.2 NO PERIODS: Primary | ICD-10-CM

## 2021-10-19 DIAGNOSIS — N92.6 IRREGULAR PERIODS/MENSTRUAL CYCLES: ICD-10-CM

## 2021-10-19 DIAGNOSIS — N91.2 ABSENT MENSES: ICD-10-CM

## 2021-10-19 PROCEDURE — 36415 COLL VENOUS BLD VENIPUNCTURE: CPT

## 2021-10-19 PROCEDURE — 84703 CHORIONIC GONADOTROPIN ASSAY: CPT

## 2021-10-19 RX ORDER — MEDROXYPROGESTERONE ACETATE 10 MG/1
10 TABLET ORAL DAILY
Qty: 5 TABLET | Refills: 0 | Status: SHIPPED | OUTPATIENT
Start: 2021-10-19 | End: 2021-10-24

## 2021-10-19 NOTE — TELEPHONE ENCOUNTER
Pt called, verified that HCG was negative. Pt informed of Provera 10 mg daily for 5 days, and to contact office if she does not have a period 7 days after finishing medication. Pt states understanding, pharmacy confirmed and RX sent.

## 2021-11-15 ENCOUNTER — NURSE TRIAGE (OUTPATIENT)
Dept: INTERNAL MEDICINE CLINIC | Facility: CLINIC | Age: 26
End: 2021-11-15

## 2021-11-15 ENCOUNTER — OFFICE VISIT (OUTPATIENT)
Dept: OBGYN CLINIC | Facility: CLINIC | Age: 26
End: 2021-11-15
Payer: COMMERCIAL

## 2021-11-15 VITALS
WEIGHT: 173 LBS | SYSTOLIC BLOOD PRESSURE: 160 MMHG | HEIGHT: 59.4 IN | BODY MASS INDEX: 34.42 KG/M2 | HEART RATE: 86 BPM | DIASTOLIC BLOOD PRESSURE: 115 MMHG

## 2021-11-15 DIAGNOSIS — I10 PRIMARY HYPERTENSION: ICD-10-CM

## 2021-11-15 DIAGNOSIS — Z01.411 ENCOUNTER FOR GYNECOLOGICAL EXAMINATION WITH ABNORMAL FINDING: Primary | ICD-10-CM

## 2021-11-15 DIAGNOSIS — N92.6 IRREGULAR MENSES: ICD-10-CM

## 2021-11-15 PROCEDURE — 3008F BODY MASS INDEX DOCD: CPT | Performed by: OBSTETRICS & GYNECOLOGY

## 2021-11-15 PROCEDURE — 99395 PREV VISIT EST AGE 18-39: CPT | Performed by: OBSTETRICS & GYNECOLOGY

## 2021-11-15 PROCEDURE — 99212 OFFICE O/P EST SF 10 MIN: CPT | Performed by: OBSTETRICS & GYNECOLOGY

## 2021-11-15 PROCEDURE — 3080F DIAST BP >= 90 MM HG: CPT | Performed by: OBSTETRICS & GYNECOLOGY

## 2021-11-15 PROCEDURE — 3077F SYST BP >= 140 MM HG: CPT | Performed by: OBSTETRICS & GYNECOLOGY

## 2021-11-15 NOTE — PROGRESS NOTES
Zhane Guerra is a 23year old female  Patient's last menstrual period was 10/15/2021. Patient presents with:  Gyn Exam: ANNUAL EXAM. Prior [address] pt. Never had pap. Told need to see PCP for BP but has never follow-up.   Other: use to be placed on ocps d (!) 160/115, pulse 86, height 4' 11.4\" (1.509 m), weight 173 lb (78.5 kg), last menstrual period 10/15/2021, not currently breastfeeding.   Constitutional:  well developed, well nourished  Head/Face:  normocephalic  Neck/Thyroid: thyroid symmetric, no thyr

## 2021-11-15 NOTE — TELEPHONE ENCOUNTER
Action Requested: Summary for Provider     []  Critical Lab, Recommendations Needed  [] Need Additional Advice  []   FYI    []   Need Orders  [] Need Medications Sent to Pharmacy  []  Other     SUMMARY: Per protocol: OV within 3 days.  Dr Joon Woods is not genet

## 2021-11-18 ENCOUNTER — OFFICE VISIT (OUTPATIENT)
Dept: INTERNAL MEDICINE CLINIC | Facility: CLINIC | Age: 26
End: 2021-11-18
Payer: COMMERCIAL

## 2021-11-18 ENCOUNTER — LAB ENCOUNTER (OUTPATIENT)
Dept: LAB | Age: 26
End: 2021-11-18
Attending: NURSE PRACTITIONER
Payer: COMMERCIAL

## 2021-11-18 ENCOUNTER — TELEPHONE (OUTPATIENT)
Dept: OBGYN CLINIC | Facility: CLINIC | Age: 26
End: 2021-11-18

## 2021-11-18 VITALS
BODY MASS INDEX: 34.47 KG/M2 | WEIGHT: 171 LBS | SYSTOLIC BLOOD PRESSURE: 170 MMHG | HEART RATE: 86 BPM | RESPIRATION RATE: 16 BRPM | HEIGHT: 59 IN | DIASTOLIC BLOOD PRESSURE: 105 MMHG

## 2021-11-18 DIAGNOSIS — F41.9 ANXIETY: ICD-10-CM

## 2021-11-18 DIAGNOSIS — I10 ESSENTIAL HYPERTENSION, BENIGN: Primary | ICD-10-CM

## 2021-11-18 DIAGNOSIS — I10 ESSENTIAL HYPERTENSION: Primary | ICD-10-CM

## 2021-11-18 DIAGNOSIS — R73.9 ELEVATED BLOOD SUGAR: ICD-10-CM

## 2021-11-18 PROCEDURE — 82088 ASSAY OF ALDOSTERONE: CPT

## 2021-11-18 PROCEDURE — 36415 COLL VENOUS BLD VENIPUNCTURE: CPT

## 2021-11-18 PROCEDURE — 84244 ASSAY OF RENIN: CPT

## 2021-11-18 PROCEDURE — 3008F BODY MASS INDEX DOCD: CPT | Performed by: NURSE PRACTITIONER

## 2021-11-18 PROCEDURE — 3077F SYST BP >= 140 MM HG: CPT | Performed by: NURSE PRACTITIONER

## 2021-11-18 PROCEDURE — 99213 OFFICE O/P EST LOW 20 MIN: CPT | Performed by: NURSE PRACTITIONER

## 2021-11-18 PROCEDURE — 80053 COMPREHEN METABOLIC PANEL: CPT | Performed by: NURSE PRACTITIONER

## 2021-11-18 PROCEDURE — 3080F DIAST BP >= 90 MM HG: CPT | Performed by: NURSE PRACTITIONER

## 2021-11-18 RX ORDER — AMLODIPINE BESYLATE 5 MG/1
5 TABLET ORAL DAILY
Qty: 30 TABLET | Refills: 0 | Status: SHIPPED | OUTPATIENT
Start: 2021-11-18

## 2021-11-18 NOTE — TELEPHONE ENCOUNTER
Patient seen 1 time in September 2020–noted that she did have a follow-up visit with the nurse practitioner today and medication was started  We will send to her GYN as FYI  Encounter closed

## 2021-11-18 NOTE — TELEPHONE ENCOUNTER
Pt w/ severe range BP at office visit few days ago -- does she have appt for f/u w/ PCP ? BP was 160/115  Sent FYI to PCP listed in chart also.

## 2021-11-18 NOTE — PROGRESS NOTES
Diane Shields is a 23year old female. Patient presents with:  Blood Pressure: elevated at [facility] appointment     HPI:   A pleasant 23year old female presents tot the clinic with elevated BP.  Patient states she checked her BP at home yesterday her SBP was betwe Ht 4' 11\" (1.499 m)   Wt 171 lb (77.6 kg)   LMP 10/15/2021   BMI 34.54 kg/m²     Physical Exam  Vitals reviewed. Constitutional:       Appearance: Normal appearance. HENT:      Head: Normocephalic.    Eyes:      Extraocular Movements: Extraocular movem

## 2021-11-27 ENCOUNTER — LAB ENCOUNTER (OUTPATIENT)
Dept: LAB | Facility: HOSPITAL | Age: 26
End: 2021-11-27
Attending: INTERNAL MEDICINE
Payer: COMMERCIAL

## 2021-11-27 DIAGNOSIS — I10 ESSENTIAL HYPERTENSION, BENIGN: ICD-10-CM

## 2021-11-27 DIAGNOSIS — R73.9 ELEVATED BLOOD SUGAR: ICD-10-CM

## 2021-11-27 DIAGNOSIS — R74.8 ELEVATED LIVER ENZYMES: ICD-10-CM

## 2021-11-27 PROCEDURE — 80076 HEPATIC FUNCTION PANEL: CPT | Performed by: INTERNAL MEDICINE

## 2021-11-27 PROCEDURE — 82088 ASSAY OF ALDOSTERONE: CPT

## 2021-11-27 PROCEDURE — 84244 ASSAY OF RENIN: CPT

## 2021-11-27 PROCEDURE — 83036 HEMOGLOBIN GLYCOSYLATED A1C: CPT

## 2021-11-27 PROCEDURE — 82784 ASSAY IGA/IGD/IGG/IGM EACH: CPT

## 2021-11-27 PROCEDURE — 36415 COLL VENOUS BLD VENIPUNCTURE: CPT

## 2021-11-27 PROCEDURE — 86256 FLUORESCENT ANTIBODY TITER: CPT

## 2021-11-30 ENCOUNTER — TELEPHONE (OUTPATIENT)
Dept: INTERNAL MEDICINE CLINIC | Facility: CLINIC | Age: 26
End: 2021-11-30

## 2021-11-30 RX ORDER — GLIMEPIRIDE 2 MG/1
2 TABLET ORAL
Qty: 30 TABLET | Refills: 0 | Status: SHIPPED | OUTPATIENT
Start: 2021-11-30

## 2021-11-30 NOTE — TELEPHONE ENCOUNTER
Patient returned call (Name and  of pt verified). All results and recommendations reviewed. Patient verbalizes understanding, denies further questions and agrees with plan of care. Call transferred to [facility] so patient can schedule an office visit.

## 2021-11-30 NOTE — TELEPHONE ENCOUNTER
Patient requesting glimepiride be sent to the [address] [place] [address]; medication sent. [address] and cancelled medication. Patient notified.

## 2021-11-30 NOTE — TELEPHONE ENCOUNTER
Pt would like refill on Rx glimepiride 2MG. Pt would like it to go to [facility]. Pt is at pharmacy now.  Please advise         Current Outpatient Medications   Medication Sig Dispense Refill   • glimepiride 2 MG Oral Tab Take 1 tablet (2 mg total) by mouth every

## 2021-12-03 ENCOUNTER — TELEPHONE (OUTPATIENT)
Dept: GASTROENTEROLOGY | Facility: CLINIC | Age: 26
End: 2021-12-03

## 2021-12-03 DIAGNOSIS — R79.89 ELEVATED LFTS: Primary | ICD-10-CM

## 2021-12-03 NOTE — TELEPHONE ENCOUNTER
Called and spoke to patient. Says she doesn't drink any alcohol. Takes a rare tylenol/acetaminophen. No OTC/herbals/supplements. Newly diagnosed with DM and started on meds.      Discussed I suspect this is still NAFLD and hopefully with weight loss (wh

## 2021-12-04 ENCOUNTER — PATIENT MESSAGE (OUTPATIENT)
Dept: INTERNAL MEDICINE CLINIC | Facility: CLINIC | Age: 26
End: 2021-12-04

## 2021-12-04 RX ORDER — SYRING-NEEDL,DISP,INSUL,0.3 ML 30 GX5/16"
SYRINGE, EMPTY DISPOSABLE MISCELLANEOUS
Qty: 100 EACH | Refills: 3 | Status: SHIPPED | OUTPATIENT
Start: 2021-12-04 | End: 2021-12-04

## 2021-12-04 RX ORDER — SYRING-NEEDL,DISP,INSUL,0.3 ML 30 GX5/16"
SYRINGE, EMPTY DISPOSABLE MISCELLANEOUS
Qty: 100 EACH | Refills: 3 | Status: SHIPPED | OUTPATIENT
Start: 2021-12-04

## 2021-12-04 RX ORDER — GLUCOSAMINE HCL/CHONDROITIN SU 500-400 MG
CAPSULE ORAL
Qty: 100 STRIP | Refills: 3 | Status: SHIPPED | OUTPATIENT
Start: 2021-12-04

## 2021-12-04 RX ORDER — GLUCOSAMINE HCL/CHONDROITIN SU 500-400 MG
CAPSULE ORAL
Qty: 100 STRIP | Refills: 3 | Status: SHIPPED | OUTPATIENT
Start: 2021-12-04 | End: 2021-12-04

## 2021-12-04 NOTE — TELEPHONE ENCOUNTER
From: Ronn Rosales  To: Gladys Orozco MD  Sent: 12/4/2021 1:44 PM CST  Subject: Glucose machine and strips    Hello,    About the glucose machine and steps that were ordered, I no longer go the [facility].  I've been using the [address] [place] [address] on [facility]

## 2021-12-07 ENCOUNTER — TELEMEDICINE (OUTPATIENT)
Dept: ENDOCRINOLOGY CLINIC | Facility: CLINIC | Age: 26
End: 2021-12-07

## 2021-12-07 DIAGNOSIS — E11.65 UNCONTROLLED TYPE 2 DIABETES MELLITUS WITH HYPERGLYCEMIA (HCC): Primary | ICD-10-CM

## 2021-12-07 PROCEDURE — 99215 OFFICE O/P EST HI 40 MIN: CPT | Performed by: NURSE PRACTITIONER

## 2021-12-07 RX ORDER — DULAGLUTIDE 0.75 MG/.5ML
0.75 INJECTION, SOLUTION SUBCUTANEOUS WEEKLY
Qty: 6 ML | Refills: 0 | Status: SHIPPED | OUTPATIENT
Start: 2021-12-07

## 2021-12-07 NOTE — PROGRESS NOTES
This visit is conducted using Telemedicine with live, interactive video and audio. Name: Minh Min  Date: 12/7/2021    Referring Physician: Nancy Melgoza   No chief complaint on file.       HISTORY OF PRESENT ILLNESS   Esvin Payne for: weight change, fever, fatigue, cold/heat intolerance  Eyes: Negative for:  Visual changes, proptosis, blurring  ENT: Negative for:  dysphagia, neck swelling, dysphonia  Respiratory: Negative for: hemoptysis, shortness of breath, cough, or dyspnea.   Ca Surgical History:   Procedure Laterality Date   • OTHER      none       PHYSICAL EXAM:   There were no vitals filed for this visit. BMI:   There is no height or weight on file to calculate BMI.     PHYSICAL EXAM  General Appearance:  alert, well developed, nephropathy: GFR: 79 on 11/18/21   c) Discussed importance of annual eye exams  d) continue testing BG readings x1  Daily- alternate with fasting or 2hrs after meals   e) Life style changes: Diet: low carbohydrate diet 30gm per meal discussed, Exercise: sh

## 2021-12-07 NOTE — PATIENT INSTRUCTIONS
A1C: 8.7% on 11/27/2021    Medications:   -stop glimepiride  -start Trulicity 6.69OH once weekly   Common side effects:    Nausea or diarrhea    These effects usually go away over time as your body gets used to the medicine      Here are some things that m

## 2021-12-10 ENCOUNTER — TELEPHONE (OUTPATIENT)
Dept: ENDOCRINOLOGY CLINIC | Facility: CLINIC | Age: 26
End: 2021-12-10

## 2021-12-10 NOTE — TELEPHONE ENCOUNTER
Received fax for prior auth    Dulaglutide (TRULICITY) 2.83 PG/4.7KO Subcutaneous Solution Pen-injector, Inject 0.75 mg into the skin once a week., Disp: 6 mL, Rfl: 0    KEY:[id number]

## 2021-12-10 NOTE — TELEPHONE ENCOUNTER
Medication PA Requested: Dulaglutide (TRULICITY) 6.79 HO/7.7RL Subcutaneous Solution Pen-injector                                                         CoverMyMeds Used:  Key:  Quantity: 6 mL  Day Supply:  Sig: Inject 0.75 mg into the skin once a week  D

## 2021-12-15 ENCOUNTER — TELEPHONE (OUTPATIENT)
Dept: ENDOCRINOLOGY CLINIC | Facility: CLINIC | Age: 26
End: 2021-12-15

## 2021-12-15 NOTE — TELEPHONE ENCOUNTER
Medication PA Requested: Dulaglutide (TRULICITY) 6.11 RA/4.4YB Subcutaneous Solution Pen-injector                                                         CoverMyMeds Used:  Key:  Quantity: 6 mL  Day Supply:  Sig: Inject 0.75 mg into the skin once a week  D

## 2021-12-15 NOTE — TELEPHONE ENCOUNTER
Pharmacy states Insurance wants pt to try oral medication Metformin  before injectable. . please call insurance company at [phone]

## 2021-12-16 NOTE — TELEPHONE ENCOUNTER
[id number] noted. Per chart review, patient has tried MTF in the past, however there was no indication documented that she should have stoped or that she has failed therapy. Patient called to review this information, however EDEL. ADATCDEBRA or to send me a  msg to review Hx of her taking MTF in the past.     Thank you!

## 2021-12-20 ENCOUNTER — PATIENT MESSAGE (OUTPATIENT)
Dept: INTERNAL MEDICINE CLINIC | Facility: CLINIC | Age: 26
End: 2021-12-20

## 2021-12-20 ENCOUNTER — PATIENT MESSAGE (OUTPATIENT)
Dept: ENDOCRINOLOGY CLINIC | Facility: CLINIC | Age: 26
End: 2021-12-20

## 2021-12-20 NOTE — TELEPHONE ENCOUNTER
From: Abiodun Jo  To: JOVITA Prescott Se  Sent: 12/20/2021 3:43 PM CST  Subject: Metformin    Hello,    Im following up in regards to the voicemail that was left regarding metformin and getting the prescription for trulicity.   When I took metformin, it

## 2021-12-21 NOTE — TELEPHONE ENCOUNTER
From: Kimberly Block  To: JOVITA Lance  Sent: 12/20/2021 3:18 PM CST  Subject: Ultrasounds    Hello,    I'm reaching out regarding the 3 ultrasounds that were ordered.  I got the estimate on how much I would need to pay and my insurance will only c

## 2021-12-21 NOTE — TELEPHONE ENCOUNTER
Routed to Whitney HUSSEIN  for advise, thanks.       Future Appointments   Date Time Provider Arnold Eusebia   12/23/2021 11:30 AM JOVITA Barrett

## 2021-12-22 NOTE — TELEPHONE ENCOUNTER
[facility] [phone], spoke with Kayli Price. She states pa for Dulaglutide/Trulicity was denied. She will refax letter to [phone].  States case notes state this med is covered only if there is a history of suboptimal response, contraindication to M

## 2021-12-23 NOTE — TELEPHONE ENCOUNTER
Patient has had Metformin in the past for PCOS but not T2DM. At that time she was able to tolerate therapy. Please refer to TE dated 12/20/21. Metformin was started. Thank you!

## 2022-02-02 ENCOUNTER — TELEPHONE (OUTPATIENT)
Dept: GASTROENTEROLOGY | Facility: CLINIC | Age: 27
End: 2022-02-02

## 2022-03-09 ENCOUNTER — TELEPHONE (OUTPATIENT)
Dept: INTERNAL MEDICINE CLINIC | Facility: CLINIC | Age: 27
End: 2022-03-09

## 2022-03-10 ENCOUNTER — PATIENT MESSAGE (OUTPATIENT)
Dept: OBGYN CLINIC | Facility: CLINIC | Age: 27
End: 2022-03-10

## 2022-03-10 NOTE — TELEPHONE ENCOUNTER
2nd call attempt. Mariia Terry [email or web address]  Pt read [employer] message. Last read by Bairon Alcaraz at 6:43 PM on 3/9/2022.

## 2022-03-10 NOTE — TELEPHONE ENCOUNTER
----- Message from Nidia Valdivia RN sent at 3/9/2022  2:02 PM CST -----  Regarding: FW: Blood Pressure follow-up      ----- Message -----  From: Jaden Figueredo  Sent: 3/9/2022  12:45 PM CST  To: [facility]  Subject: Blood Pressure follow-up                         Hello,    I'm sorry for the late update. I had been traveling back and forth the past few months and haven't been consistent with tracking everything. I've noticed after cutting out fast food and changing diet/exercise that my bp has been better. When I saw you last, you told me to take the meds based on my bp readings, and usually, I only needed to take them once a week. Also, I have been having some side effects from the medication: like headaches and if I take it more often, dizziness, or lightheadedness during the day which had never happened before. I had a question about the next steps. My insurance won't cover the liver ultrasound, so I've been looking at other clinics/labs that are more affordable. However, should I come in for a follow-up appointment before then? I'm also out of the bp medication as well.      Hope to hear back soon,    Jayden Ott

## 2022-03-10 NOTE — TELEPHONE ENCOUNTER
Patient currently denies symptoms. The last time she took her blood pressure medication was last month. Appointment made to go over her blood pressure medications.      Future Appointments   Date Time Provider Arnold Laws   3/14/2022  3:30 PM JOVITA Prince

## 2022-03-11 NOTE — TELEPHONE ENCOUNTER
[facility] please advise. Pt reports not having a menses since November and asking for Provera Rx. Per [facility] visit notes on 11/15/21 \"  Pap done. Reviewed ASCCP guidelines. Never sexually active. Recheck BP now -- warned pt that has severe enough BP to cause stroke or MI. Needs to see PCP. Cannot offer ocps until BP controlled. Reviewed need for period every 3 months to avoid uterine complications. F/u in 3 months in order to check BP & f/u on HTN. If normal range BP, then may consider ocps to control periods. Encouraged exercise. \"    Pt saw PCP for HTN on 11/18/21. B/P=170/105. Pt has not followed up.

## 2022-03-11 NOTE — TELEPHONE ENCOUNTER
From: Yessenia Terry  To: Brown Handley MD  Sent: 3/10/2022 3:06 PM CST  Subject: PCOS follow-up    Hello,  I'm reaching out because when I tried to schedule an appointment, the first available wasn't until May. However, I haven't had my period since November. I don't have any refills of the Provera left and you wanted to see me again in person before prescribing birth control. Will it be possible to get a refill before the appointment or is it possible to see you before May?     Thanks for your help,    Guillaume Dickson

## 2022-03-11 NOTE — TELEPHONE ENCOUNTER
Order blood pregn test or UPT in lab if never active & then can give provera 10 mg po every day x 5d

## 2022-03-14 ENCOUNTER — OFFICE VISIT (OUTPATIENT)
Dept: INTERNAL MEDICINE CLINIC | Facility: CLINIC | Age: 27
End: 2022-03-14
Payer: COMMERCIAL

## 2022-03-14 ENCOUNTER — TELEPHONE (OUTPATIENT)
Dept: OBGYN CLINIC | Facility: CLINIC | Age: 27
End: 2022-03-14

## 2022-03-14 VITALS
WEIGHT: 174 LBS | SYSTOLIC BLOOD PRESSURE: 179 MMHG | HEIGHT: 59 IN | DIASTOLIC BLOOD PRESSURE: 96 MMHG | BODY MASS INDEX: 35.08 KG/M2 | HEART RATE: 85 BPM

## 2022-03-14 DIAGNOSIS — Z32.00 PREGNANCY EXAMINATION OR TEST, PREGNANCY UNCONFIRMED: Primary | ICD-10-CM

## 2022-03-14 DIAGNOSIS — E11.65 UNCONTROLLED TYPE 2 DIABETES MELLITUS WITH HYPERGLYCEMIA (HCC): ICD-10-CM

## 2022-03-14 DIAGNOSIS — I10 ESSENTIAL HYPERTENSION: Primary | ICD-10-CM

## 2022-03-14 DIAGNOSIS — R74.8 ELEVATED LIVER ENZYMES: ICD-10-CM

## 2022-03-14 PROCEDURE — 3077F SYST BP >= 140 MM HG: CPT | Performed by: NURSE PRACTITIONER

## 2022-03-14 PROCEDURE — 3008F BODY MASS INDEX DOCD: CPT | Performed by: NURSE PRACTITIONER

## 2022-03-14 PROCEDURE — 3080F DIAST BP >= 90 MM HG: CPT | Performed by: NURSE PRACTITIONER

## 2022-03-14 PROCEDURE — 99213 OFFICE O/P EST LOW 20 MIN: CPT | Performed by: NURSE PRACTITIONER

## 2022-03-14 RX ORDER — LISINOPRIL 10 MG/1
10 TABLET ORAL DAILY
Qty: 90 TABLET | Refills: 0 | Status: SHIPPED | OUTPATIENT
Start: 2022-03-14 | End: 2022-06-12

## 2022-03-14 NOTE — TELEPHONE ENCOUNTER
See 3/10 [employer] message-pt called and informed of [facility] recs for UTP or serum HCG prior to Provera 10 mg every day x5 days. Pt states she is not sexually active, informed she would need to go to one of our lab locations for a urine pregnancy test. Pt informed to call the office back as soon as she see results or with in 24 hrs and medication will be sent. Pt informed she will take the entire 5 day course and should have a cycle in 7-14 days once she has finished the medication. Pt informed if no cycle by day 14 to call office back. Pt states understanding.

## 2022-03-16 ENCOUNTER — LAB ENCOUNTER (OUTPATIENT)
Dept: LAB | Age: 27
End: 2022-03-16
Attending: OBSTETRICS & GYNECOLOGY
Payer: COMMERCIAL

## 2022-03-16 DIAGNOSIS — Z32.00 PREGNANCY EXAMINATION OR TEST, PREGNANCY UNCONFIRMED: ICD-10-CM

## 2022-03-16 LAB — B-HCG UR QL: NEGATIVE

## 2022-03-16 PROCEDURE — 81025 URINE PREGNANCY TEST: CPT

## 2022-03-17 NOTE — TELEPHONE ENCOUNTER
LOV Tele: 12/7/21    RTC: 4 Weeks    FU: No FU Appt Scheduled    3 Month Supply Pending      [employer] message sent to schedule fu appt

## 2022-03-18 ENCOUNTER — MED REC SCAN ONLY (OUTPATIENT)
Dept: INTERNAL MEDICINE CLINIC | Facility: CLINIC | Age: 27
End: 2022-03-18

## 2022-06-16 ENCOUNTER — APPOINTMENT (OUTPATIENT)
Dept: OBGYN | Age: 27
End: 2022-06-16

## 2022-07-08 ENCOUNTER — APPOINTMENT (OUTPATIENT)
Dept: INTERNAL MEDICINE | Age: 27
End: 2022-07-08

## 2022-07-21 ENCOUNTER — APPOINTMENT (OUTPATIENT)
Dept: INTERNAL MEDICINE | Age: 27
End: 2022-07-21

## 2022-09-22 ENCOUNTER — TELEPHONE (OUTPATIENT)
Dept: OBGYN | Age: 27
End: 2022-09-22

## 2022-10-11 ENCOUNTER — OFFICE VISIT (OUTPATIENT)
Dept: INTERNAL MEDICINE | Age: 27
End: 2022-10-11

## 2022-10-11 VITALS
RESPIRATION RATE: 18 BRPM | DIASTOLIC BLOOD PRESSURE: 88 MMHG | OXYGEN SATURATION: 100 % | WEIGHT: 172 LBS | HEIGHT: 60 IN | BODY MASS INDEX: 33.77 KG/M2 | HEART RATE: 89 BPM | TEMPERATURE: 97.8 F | SYSTOLIC BLOOD PRESSURE: 146 MMHG

## 2022-10-11 DIAGNOSIS — N91.4 SECONDARY OLIGOMENORRHEA: ICD-10-CM

## 2022-10-11 DIAGNOSIS — E78.2 MIXED HYPERLIPIDEMIA: ICD-10-CM

## 2022-10-11 DIAGNOSIS — E28.2 PCOS (POLYCYSTIC OVARIAN SYNDROME): ICD-10-CM

## 2022-10-11 DIAGNOSIS — I10 HYPERTENSION, UNSPECIFIED TYPE: ICD-10-CM

## 2022-10-11 DIAGNOSIS — Z13.29 SCREENING FOR THYROID DISORDER: ICD-10-CM

## 2022-10-11 DIAGNOSIS — E11.65 TYPE 2 DIABETES MELLITUS WITH HYPERGLYCEMIA, WITHOUT LONG-TERM CURRENT USE OF INSULIN (CMD): Primary | ICD-10-CM

## 2022-10-11 DIAGNOSIS — Z23 IMMUNIZATION DUE: ICD-10-CM

## 2022-10-11 PROCEDURE — 90471 IMMUNIZATION ADMIN: CPT | Performed by: INTERNAL MEDICINE

## 2022-10-11 PROCEDURE — 3079F DIAST BP 80-89 MM HG: CPT | Performed by: INTERNAL MEDICINE

## 2022-10-11 PROCEDURE — 3077F SYST BP >= 140 MM HG: CPT | Performed by: INTERNAL MEDICINE

## 2022-10-11 PROCEDURE — 96127 BRIEF EMOTIONAL/BEHAV ASSMT: CPT | Performed by: INTERNAL MEDICINE

## 2022-10-11 PROCEDURE — 90715 TDAP VACCINE 7 YRS/> IM: CPT | Performed by: INTERNAL MEDICINE

## 2022-10-11 PROCEDURE — 99204 OFFICE O/P NEW MOD 45 MIN: CPT | Performed by: INTERNAL MEDICINE

## 2022-10-11 RX ORDER — LOSARTAN POTASSIUM 25 MG/1
25 TABLET ORAL DAILY
Qty: 30 TABLET | Refills: 2 | Status: SHIPPED | OUTPATIENT
Start: 2022-10-11 | End: 2022-12-20 | Stop reason: SDUPTHER

## 2022-10-11 RX ORDER — MEDROXYPROGESTERONE ACETATE 10 MG/1
10 TABLET ORAL
COMMUNITY
Start: 2022-03-17 | End: 2023-03-17

## 2022-10-11 RX ORDER — OFLOXACIN 3 MG/ML
SOLUTION/ DROPS OPHTHALMIC
COMMUNITY
Start: 2022-09-30

## 2022-10-11 ASSESSMENT — ENCOUNTER SYMPTOMS
ABDOMINAL PAIN: 0
HEADACHES: 0
BRUISES/BLEEDS EASILY: 0
POLYDIPSIA: 0
FEVER: 0
DIZZINESS: 0
SORE THROAT: 0
SHORTNESS OF BREATH: 0
EYE PAIN: 0
CHILLS: 0
SINUS PAIN: 0

## 2022-10-11 ASSESSMENT — PATIENT HEALTH QUESTIONNAIRE - PHQ9
SUM OF ALL RESPONSES TO PHQ QUESTIONS 1-9: 12
2. FEELING DOWN, DEPRESSED OR HOPELESS: NEARLY EVERY DAY
4. FEELING TIRED OR HAVING LITTLE ENERGY: NEARLY EVERY DAY
5. POOR APPETITE, WEIGHT LOSS, OR OVEREATING: NOT AT ALL
1. LITTLE INTEREST OR PLEASURE IN DOING THINGS: NOT AT ALL
10. IF YOU CHECKED OFF ANY PROBLEMS, HOW DIFFICULT HAVE THESE PROBLEMS MADE IT FOR YOU TO DO YOUR WORK, TAKE CARE OF THINGS AT HOME, OR GET ALONG WITH OTHER PEOPLE: SOMEWHAT DIFFICULT
3. TROUBLE FALLING OR STAYING ASLEEP OR SLEEPING TOO MUCH: SEVERAL DAYS
CLINICAL INTERPRETATION OF PHQ9 SCORE: MODERATE DEPRESSION
7. TROUBLE CONCENTRATING ON THINGS, SUCH AS READING THE NEWSPAPER OR WATCHING TELEVISION: NEARLY EVERY DAY
8. MOVING OR SPEAKING SO SLOWLY THAT OTHER PEOPLE COULD HAVE NOTICED. OR THE OPPOSITE, BEING SO FIGETY OR RESTLESS THAT YOU HAVE BEEN MOVING AROUND A LOT MORE THAN USUAL: NOT AT ALL
SUM OF ALL RESPONSES TO PHQ9 QUESTIONS 1 AND 2: 3
9. THOUGHTS THAT YOU WOULD BE BETTER OFF DEAD, OR OF HURTING YOURSELF: NOT AT ALL
CLINICAL INTERPRETATION OF PHQ2 SCORE: FURTHER SCREENING NEEDED
6. FEELING BAD ABOUT YOURSELF - OR THAT YOU ARE A FAILURE OR HAVE LET YOURSELF OR YOUR FAMILY DOWN: MORE THAN HALF THE DAYS
SUM OF ALL RESPONSES TO PHQ9 QUESTIONS 1 AND 2: 3

## 2022-10-11 ASSESSMENT — PAIN SCALES - GENERAL: PAINLEVEL: 0

## 2022-11-01 ENCOUNTER — TELEPHONE (OUTPATIENT)
Dept: OBGYN | Age: 27
End: 2022-11-01

## 2022-12-05 ENCOUNTER — APPOINTMENT (OUTPATIENT)
Dept: NUTRITION | Age: 27
End: 2022-12-05

## 2022-12-20 DIAGNOSIS — I10 HYPERTENSION, UNSPECIFIED TYPE: ICD-10-CM

## 2022-12-20 RX ORDER — LOSARTAN POTASSIUM 25 MG/1
25 TABLET ORAL DAILY
Qty: 90 TABLET | Refills: 0 | Status: SHIPPED | OUTPATIENT
Start: 2022-12-20 | End: 2023-09-28

## 2022-12-22 DIAGNOSIS — E11.65 TYPE 2 DIABETES MELLITUS WITH HYPERGLYCEMIA, WITHOUT LONG-TERM CURRENT USE OF INSULIN (CMD): ICD-10-CM

## 2023-01-09 ENCOUNTER — APPOINTMENT (OUTPATIENT)
Dept: NUTRITION | Age: 28
End: 2023-01-09

## 2023-01-16 ENCOUNTER — APPOINTMENT (OUTPATIENT)
Dept: NUTRITION | Age: 28
End: 2023-01-16

## 2023-01-23 ENCOUNTER — APPOINTMENT (OUTPATIENT)
Dept: NUTRITION | Age: 28
End: 2023-01-23

## 2023-02-14 ENCOUNTER — CLINICAL ABSTRACT (OUTPATIENT)
Dept: OTHER | Age: 28
End: 2023-02-14

## 2023-02-24 NOTE — TELEPHONE ENCOUNTER
2nd overdue reminder letter mailed out to patient.
Overdue reminder letter mailed out to patient.     Lab:   HEPATIC FUNCTION PANEL (7)
Hepatitis B , 2021 13:45 , Sujata AlexandreRN)  
same name as above

## 2023-06-26 ENCOUNTER — TELEPHONE (OUTPATIENT)
Dept: OBGYN | Age: 28
End: 2023-06-26

## 2023-09-06 ENCOUNTER — APPOINTMENT (OUTPATIENT)
Dept: FAMILY MEDICINE | Age: 28
End: 2023-09-06

## 2023-09-26 DIAGNOSIS — I10 HYPERTENSION, UNSPECIFIED TYPE: ICD-10-CM

## 2023-09-28 RX ORDER — LOSARTAN POTASSIUM 25 MG/1
25 TABLET ORAL DAILY
Qty: 90 TABLET | Refills: 0 | Status: SHIPPED | OUTPATIENT
Start: 2023-09-28

## 2024-05-10 ENCOUNTER — APPOINTMENT (OUTPATIENT)
Dept: LAB | Age: 29
End: 2024-05-10

## (undated) NOTE — MR AVS SNAPSHOT
After Visit Summary   11/15/2021    Kimberly Block   MRN: [id number]           Visit Information     Date & Time  11/15/2021  1:20 PM Provider  Anabell Ventura MD [address] [place] [address], [address] [place] [address],[address], [employer]  Phone  [phone] complete it and provide feedback. We strive to deliver the best patient experience and are looking for ways to make improvements. Your feedback will help us do so. For more information on [employer], please visit [email or web address]

## (undated) NOTE — LETTER
02/02/22        [facility]      Dear Eliud Kowalski,    [facility] records indicate that you have outstanding lab work and or testing that was ordered for you and has not yet been completed:     HEPATIC FUNCTION PANEL (7)    To provide you with the best possible care, please complete these orders at your earliest convenience. If you have recently completed these orders please disregard this letter. If you have any questions please call the office at [phone].      Thank you,     The Staff at [facility] [address] MD